# Patient Record
Sex: FEMALE | Race: WHITE | URBAN - METROPOLITAN AREA
[De-identification: names, ages, dates, MRNs, and addresses within clinical notes are randomized per-mention and may not be internally consistent; named-entity substitution may affect disease eponyms.]

---

## 2018-08-22 ENCOUNTER — APPOINTMENT (RX ONLY)
Dept: URBAN - METROPOLITAN AREA CLINIC 45 | Facility: CLINIC | Age: 58
Setting detail: DERMATOLOGY
End: 2018-08-22

## 2018-08-22 DIAGNOSIS — D22 MELANOCYTIC NEVI: ICD-10-CM | Status: STABLE

## 2018-08-22 DIAGNOSIS — L30.4 ERYTHEMA INTERTRIGO: ICD-10-CM

## 2018-08-22 DIAGNOSIS — L81.2 FRECKLES: ICD-10-CM | Status: STABLE
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
## 2018-08-22 DIAGNOSIS — L57.0 ACTINIC KERATOSIS: ICD-10-CM | Status: INADEQUATELY CONTROLLED
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
## 2018-08-22 PROBLEM — D22.4 MELANOCYTIC NEVI OF SCALP AND NECK: Status: ACTIVE | Noted: 2018-08-22

## 2018-08-22 PROCEDURE — ? COUNSELING

## 2018-08-22 PROCEDURE — 17000 DESTRUCT PREMALG LESION: CPT

## 2018-08-22 PROCEDURE — ? TREATMENT REGIMEN

## 2018-08-22 PROCEDURE — 99214 OFFICE O/P EST MOD 30 MIN: CPT | Mod: 25

## 2018-08-22 PROCEDURE — ? SUNSCREEN RECOMMENDATIONS

## 2018-08-22 PROCEDURE — ? LIQUID NITROGEN

## 2018-08-22 PROCEDURE — 17003 DESTRUCT PREMALG LES 2-14: CPT

## 2018-08-22 ASSESSMENT — LOCATION SIMPLE DESCRIPTION DERM
LOCATION SIMPLE: POSTERIOR SCALP
LOCATION SIMPLE: NOSE
LOCATION SIMPLE: LEFT PRETIBIAL REGION
LOCATION SIMPLE: LEFT FOREHEAD
LOCATION SIMPLE: LEFT UPPER BACK
LOCATION SIMPLE: ABDOMEN

## 2018-08-22 ASSESSMENT — LOCATION ZONE DERM
LOCATION ZONE: FACE
LOCATION ZONE: SCALP
LOCATION ZONE: TRUNK
LOCATION ZONE: NOSE
LOCATION ZONE: LEG

## 2018-08-22 ASSESSMENT — LOCATION DETAILED DESCRIPTION DERM
LOCATION DETAILED: LEFT RIB CAGE
LOCATION DETAILED: RIGHT OCCIPITAL SCALP
LOCATION DETAILED: NASAL DORSUM
LOCATION DETAILED: LEFT SUPERIOR MEDIAL UPPER BACK
LOCATION DETAILED: LEFT DISTAL PRETIBIAL REGION
LOCATION DETAILED: RIGHT RIB CAGE
LOCATION DETAILED: LEFT SUPERIOR FOREHEAD

## 2018-08-22 ASSESSMENT — PAIN INTENSITY VAS: HOW INTENSE IS YOUR PAIN 0 BEING NO PAIN, 10 BEING THE MOST SEVERE PAIN POSSIBLE?: NO PAIN

## 2018-08-22 NOTE — PROCEDURE: SUNSCREEN RECOMMENDATIONS
Products Recommended: Elta MD UV Elements
Detail Level: Zone
General Sunscreen Counseling: I recommended a broad spectrum sunscreen with a SPF of 30 or higher.  I explained that SPF 30 sunscreens block approximately 97 percent of the sun's harmful rays.  Sunscreens should be applied at least 15 minutes prior to expected sun exposure and then every 2 hours after that as long as sun exposure continues. If swimming or exercising sunscreen should be reapplied every 45 minutes to an hour after getting wet or sweating.  One ounce, or the equivalent of a shot glass full of sunscreen, is adequate to protect the skin not covered by a bathing suit. I also recommended a lip balm with a sunscreen as well. Sun protective clothing can be used in lieu of sunscreen but must be worn the entire time you are exposed to the sun's rays.

## 2019-09-30 ENCOUNTER — APPOINTMENT (RX ONLY)
Dept: URBAN - METROPOLITAN AREA CLINIC 45 | Facility: CLINIC | Age: 59
Setting detail: DERMATOLOGY
End: 2019-09-30

## 2019-09-30 DIAGNOSIS — L57.0 ACTINIC KERATOSIS: ICD-10-CM

## 2019-09-30 DIAGNOSIS — D22 MELANOCYTIC NEVI: ICD-10-CM | Status: STABLE

## 2019-09-30 DIAGNOSIS — L30.4 ERYTHEMA INTERTRIGO: ICD-10-CM

## 2019-09-30 DIAGNOSIS — L82.1 OTHER SEBORRHEIC KERATOSIS: ICD-10-CM | Status: STABLE

## 2019-09-30 DIAGNOSIS — L28.1 PRURIGO NODULARIS: ICD-10-CM

## 2019-09-30 PROBLEM — D22.5 MELANOCYTIC NEVI OF TRUNK: Status: ACTIVE | Noted: 2019-09-30

## 2019-09-30 PROCEDURE — ? OTHER

## 2019-09-30 PROCEDURE — ? TREATMENT REGIMEN

## 2019-09-30 PROCEDURE — ? PHOTO-DOCUMENTATION

## 2019-09-30 PROCEDURE — 17000 DESTRUCT PREMALG LESION: CPT

## 2019-09-30 PROCEDURE — ? COUNSELING

## 2019-09-30 PROCEDURE — 99214 OFFICE O/P EST MOD 30 MIN: CPT | Mod: 25

## 2019-09-30 PROCEDURE — ? LIQUID NITROGEN

## 2019-09-30 ASSESSMENT — LOCATION DETAILED DESCRIPTION DERM
LOCATION DETAILED: LEFT RIB CAGE
LOCATION DETAILED: RIGHT SUPERIOR POSTERIOR NECK
LOCATION DETAILED: SUPERIOR LUMBAR SPINE
LOCATION DETAILED: LEFT UPPER CUTANEOUS LIP
LOCATION DETAILED: RIGHT DISTAL POSTERIOR UPPER ARM
LOCATION DETAILED: RIGHT RIB CAGE
LOCATION DETAILED: XIPHOID
LOCATION DETAILED: RIGHT SUPERIOR LATERAL LOWER BACK

## 2019-09-30 ASSESSMENT — LOCATION SIMPLE DESCRIPTION DERM
LOCATION SIMPLE: LOWER BACK
LOCATION SIMPLE: LEFT LIP
LOCATION SIMPLE: RIGHT LOWER BACK
LOCATION SIMPLE: POSTERIOR NECK
LOCATION SIMPLE: RIGHT POSTERIOR UPPER ARM
LOCATION SIMPLE: ABDOMEN

## 2019-09-30 ASSESSMENT — PAIN INTENSITY VAS: HOW INTENSE IS YOUR PAIN 0 BEING NO PAIN, 10 BEING THE MOST SEVERE PAIN POSSIBLE?: NO PAIN

## 2019-09-30 ASSESSMENT — LOCATION ZONE DERM
LOCATION ZONE: ARM
LOCATION ZONE: NECK
LOCATION ZONE: TRUNK
LOCATION ZONE: LIP

## 2019-09-30 NOTE — PROCEDURE: OTHER
Detail Level: Simple
Note Text (......Xxx Chief Complaint.): This diagnosis correlates with the
Other (Free Text): LN2 no charge

## 2019-10-11 ENCOUNTER — APPOINTMENT (RX ONLY)
Dept: URBAN - METROPOLITAN AREA CLINIC 45 | Facility: CLINIC | Age: 59
Setting detail: DERMATOLOGY
End: 2019-10-11

## 2019-10-11 DIAGNOSIS — Z41.9 ENCOUNTER FOR PROCEDURE FOR PURPOSES OTHER THAN REMEDYING HEALTH STATE, UNSPECIFIED: ICD-10-CM

## 2019-10-11 PROCEDURE — ? LUMENIS M22

## 2019-10-11 ASSESSMENT — LOCATION ZONE DERM
LOCATION ZONE: FACE
LOCATION ZONE: NOSE

## 2019-10-11 ASSESSMENT — LOCATION DETAILED DESCRIPTION DERM
LOCATION DETAILED: RIGHT INFERIOR CENTRAL MALAR CHEEK
LOCATION DETAILED: NASAL DORSUM
LOCATION DETAILED: LEFT CENTRAL MALAR CHEEK

## 2019-10-11 ASSESSMENT — LOCATION SIMPLE DESCRIPTION DERM
LOCATION SIMPLE: LEFT CHEEK
LOCATION SIMPLE: RIGHT CHEEK
LOCATION SIMPLE: NOSE

## 2019-10-11 NOTE — PROCEDURE: LUMENIS M22
Pulse Delay (In Milliseconds): 20
External Cooling Fan Speed: 0
Fluence: 18
Consent: Written consent obtained, risks reviewed including but not limited to crusting, scabbing, blistering, scarring, darker or lighter pigmentary change, bruising, and/or incomplete response.
Fluence Units: J/cm2
Number Of Passes: 1
Skin Type (Optional): III
Total Pulses: 20
Procedure Text: The patient's skin was cleaned and the procedure was performed using the parameters noted above.
Treated Area: large area
Pulse Duration (In Milliseconds): 3.5
Detail Level: Generalized
Depth: medium
Post-Care Instructions: I reviewed with the patient in detail post-care instructions. Patient should stay away from the sun and use sunscreen, client is very dark she is saying no sun for the last two weeks but wanted to do the treatment. Had her sign paperwork that she could burn if she has been in the sun.
Price (Use Numbers Only, No Special Characters Or $): 50.00

## 2019-12-05 ENCOUNTER — APPOINTMENT (RX ONLY)
Dept: URBAN - METROPOLITAN AREA CLINIC 45 | Facility: CLINIC | Age: 59
Setting detail: DERMATOLOGY
End: 2019-12-05

## 2019-12-05 DIAGNOSIS — Z41.9 ENCOUNTER FOR PROCEDURE FOR PURPOSES OTHER THAN REMEDYING HEALTH STATE, UNSPECIFIED: ICD-10-CM

## 2019-12-05 DIAGNOSIS — Z41.9 ENCOUNTER FOR PROCEDURE FOR PURPOSES OTHER THAN REMEDYING HEALTH STATE, UNSPECIFIED: ICD-10-CM | Status: INADEQUATELY CONTROLLED

## 2019-12-05 PROCEDURE — ? FILLERS

## 2019-12-05 PROCEDURE — ? LUMENIS M22

## 2019-12-05 ASSESSMENT — LOCATION SIMPLE DESCRIPTION DERM
LOCATION SIMPLE: LEFT CHEEK
LOCATION SIMPLE: RIGHT CHEEK
LOCATION SIMPLE: LEFT LIP
LOCATION SIMPLE: RIGHT NOSE
LOCATION SIMPLE: LEFT CHEEK
LOCATION SIMPLE: RIGHT LIP

## 2019-12-05 ASSESSMENT — LOCATION ZONE DERM
LOCATION ZONE: NOSE
LOCATION ZONE: LIP
LOCATION ZONE: FACE
LOCATION ZONE: FACE

## 2019-12-05 ASSESSMENT — LOCATION DETAILED DESCRIPTION DERM
LOCATION DETAILED: LEFT MEDIAL BUCCAL CHEEK
LOCATION DETAILED: LEFT NASOLABIAL FOLD
LOCATION DETAILED: RIGHT NASOLABIAL FOLD
LOCATION DETAILED: LEFT MEDIAL MALAR CHEEK
LOCATION DETAILED: RIGHT NASAL ALA
LOCATION DETAILED: RIGHT MEDIAL BUCCAL CHEEK

## 2019-12-05 NOTE — PROCEDURE: LUMENIS M22
Skin Type (Optional): III
Fluence: 19
Total Pulses: 4
Consent: Written consent obtained, risks reviewed including but not limited to crusting, scabbing, blistering, scarring, darker or lighter pigmentary change, bruising, and/or incomplete response.
Treated Area: large area
Treatment Number: 2
External Cooling Fan Speed: 0
Number Of Passes: 1
Depth: medium
Detail Level: Generalized
Post-Care Instructions: I reviewed with the patient in detail post-care instructions. Patient should stay away from the sun and use sunscreen, client is very dark she is saying no sun for the last two weeks but wanted to do the treatment. Had her sign paperwork that she could burn if she has been in the sun.
Pulse Delay (In Milliseconds): 20
Price (Use Numbers Only, No Special Characters Or $): 50.00
Fluence Units: J/cm2
Procedure Text: The patient's skin was cleaned and the procedure was performed using the parameters noted above.

## 2019-12-05 NOTE — PROCEDURE: FILLERS
Include Cannula Information In Note?: No
Vermilion Lips Filler Volume In Cc: 0
Anesthesia Volume In Cc: 0.5
Additional Anesthesia Volume In Cc: 6
Filler: Restylane
Detail Level: Zone
Consent: Written consent obtained. Risks include but not limited to bruising, beading, irregular texture, ulceration, infection, allergic reaction, scar formation, incomplete augmentation, temporary nature, procedural pain.
Post-Care Instructions: Patient instructed to apply ice to reduce swelling.
Price (Use Numbers Only, No Special Characters Or $): 1000
Use Map Statement For Sites (Optional): Yes
Lot #: [id number]
Map Statment: See Attach Map for Complete Details
Expiration Date (Month Year): 03-

## 2020-06-04 ENCOUNTER — APPOINTMENT (RX ONLY)
Dept: URBAN - METROPOLITAN AREA CLINIC 45 | Facility: CLINIC | Age: 60
Setting detail: DERMATOLOGY
End: 2020-06-04

## 2020-06-04 VITALS — TEMPERATURE: 99.8 F

## 2020-06-04 DIAGNOSIS — L72.8 OTHER FOLLICULAR CYSTS OF THE SKIN AND SUBCUTANEOUS TISSUE: ICD-10-CM

## 2020-06-04 DIAGNOSIS — L73.8 OTHER SPECIFIED FOLLICULAR DISORDERS: ICD-10-CM

## 2020-06-04 DIAGNOSIS — L98.8 OTHER SPECIFIED DISORDERS OF THE SKIN AND SUBCUTANEOUS TISSUE: ICD-10-CM

## 2020-06-04 PROCEDURE — ? COSMETIC CONSULTATION: FILLERS

## 2020-06-04 PROCEDURE — ? FILLERS (CC)

## 2020-06-04 PROCEDURE — ? ADDITIONAL NOTES

## 2020-06-04 PROCEDURE — ? COUNSELING

## 2020-06-04 ASSESSMENT — LOCATION SIMPLE DESCRIPTION DERM
LOCATION SIMPLE: LEFT NOSE
LOCATION SIMPLE: RIGHT NOSE
LOCATION SIMPLE: RIGHT CHEEK
LOCATION SIMPLE: LEFT CHEEK

## 2020-06-04 ASSESSMENT — LOCATION DETAILED DESCRIPTION DERM
LOCATION DETAILED: RIGHT INFERIOR MEDIAL MALAR CHEEK
LOCATION DETAILED: LEFT NASAL SIDEWALL
LOCATION DETAILED: LEFT SUPERIOR MEDIAL BUCCAL CHEEK
LOCATION DETAILED: RIGHT NASAL SIDEWALL

## 2020-06-04 ASSESSMENT — LOCATION ZONE DERM
LOCATION ZONE: FACE
LOCATION ZONE: NOSE

## 2020-06-04 NOTE — PROCEDURE: FILLERS (CC)
Nasolabial Folds Filler Volume In Cc: 1
Detail Level: Detailed
Marionette Lines Filler  Volume In Cc: 0
Lot #: [id number]
Price (Use Numbers Only, No Special Characters Or $): 500
Filler: Dion Samuels
Anesthesia Volume In Cc: 0.5
Expiration Date (Month Year): 08/31/2022
Consent: Written consent obtained. Risks include but not limited to bruising, beading, irregular texture, ulceration, infection, allergic reaction, scar formation, incomplete augmentation, temporary nature, procedural pain.
Anesthesia Type: 1% lidocaine with epinephrine
Additional Anesthesia Volume In Cc: 6
Post-Care Instructions: Patient instructed to apply ice to reduce swelling.

## 2020-09-30 ENCOUNTER — APPOINTMENT (RX ONLY)
Dept: URBAN - METROPOLITAN AREA CLINIC 47 | Facility: CLINIC | Age: 60
Setting detail: DERMATOLOGY
End: 2020-09-30

## 2020-09-30 VITALS — TEMPERATURE: 98 F

## 2020-09-30 DIAGNOSIS — D22 MELANOCYTIC NEVI: ICD-10-CM

## 2020-09-30 DIAGNOSIS — L57.0 ACTINIC KERATOSIS: ICD-10-CM

## 2020-09-30 PROBLEM — D22.5 MELANOCYTIC NEVI OF TRUNK: Status: ACTIVE | Noted: 2020-09-30

## 2020-09-30 PROCEDURE — ? COUNSELING

## 2020-09-30 PROCEDURE — 17000 DESTRUCT PREMALG LESION: CPT

## 2020-09-30 PROCEDURE — ? LIQUID NITROGEN

## 2020-09-30 PROCEDURE — ? PRESCRIPTION

## 2020-09-30 PROCEDURE — ? TREATMENT REGIMEN

## 2020-09-30 PROCEDURE — 99214 OFFICE O/P EST MOD 30 MIN: CPT | Mod: 25

## 2020-09-30 RX ORDER — PHARMACY COMPOUNDING ACCESSORY
EACH MISCELLANEOUS BID
Qty: 1 | Refills: 0 | Status: ERX | COMMUNITY
Start: 2020-09-30

## 2020-09-30 RX ADMIN — Medication: at 00:00

## 2020-09-30 ASSESSMENT — LOCATION ZONE DERM
LOCATION ZONE: NOSE
LOCATION ZONE: TRUNK

## 2020-09-30 ASSESSMENT — LOCATION DETAILED DESCRIPTION DERM
LOCATION DETAILED: RIGHT SUPERIOR UPPER BACK
LOCATION DETAILED: EPIGASTRIC SKIN
LOCATION DETAILED: LEFT NASAL DORSUM

## 2020-09-30 ASSESSMENT — LOCATION SIMPLE DESCRIPTION DERM
LOCATION SIMPLE: ABDOMEN
LOCATION SIMPLE: NOSE
LOCATION SIMPLE: RIGHT UPPER BACK

## 2020-12-07 ENCOUNTER — APPOINTMENT (RX ONLY)
Dept: URBAN - METROPOLITAN AREA CLINIC 45 | Facility: CLINIC | Age: 60
Setting detail: DERMATOLOGY
End: 2020-12-07

## 2020-12-07 VITALS — TEMPERATURE: 98.6 F

## 2020-12-07 DIAGNOSIS — L71.8 OTHER ROSACEA: ICD-10-CM

## 2020-12-07 DIAGNOSIS — L98.8 OTHER SPECIFIED DISORDERS OF THE SKIN AND SUBCUTANEOUS TISSUE: ICD-10-CM

## 2020-12-07 PROCEDURE — ? ADDITIONAL NOTES

## 2020-12-07 PROCEDURE — ? TREATMENT REGIMEN

## 2020-12-07 PROCEDURE — ? FILLERS (CC)

## 2020-12-07 PROCEDURE — ? COUNSELING

## 2020-12-07 ASSESSMENT — LOCATION ZONE DERM: LOCATION ZONE: FACE

## 2020-12-07 ASSESSMENT — LOCATION SIMPLE DESCRIPTION DERM
LOCATION SIMPLE: LEFT CHEEK
LOCATION SIMPLE: RIGHT CHEEK

## 2020-12-07 ASSESSMENT — LOCATION DETAILED DESCRIPTION DERM
LOCATION DETAILED: LEFT INFERIOR MEDIAL MALAR CHEEK
LOCATION DETAILED: RIGHT INFERIOR MEDIAL MALAR CHEEK
LOCATION DETAILED: LEFT MEDIAL MALAR CHEEK
LOCATION DETAILED: RIGHT MEDIAL MALAR CHEEK

## 2020-12-07 NOTE — PROCEDURE: ADDITIONAL NOTES
Additional Notes: Sale price given: $625 minus 20% discount = $500\\nCosmetic Face Sheet Scanned
Detail Level: Simple

## 2020-12-07 NOTE — PROCEDURE: FILLERS (CC)
Nasolabial Folds Filler Volume In Cc: 1
Detail Level: Detailed
Marionette Lines Filler  Volume In Cc: 0
Lot #: [id number]
Price (Use Numbers Only, No Special Characters Or $): 500
Filler: Dion Samuels
Anesthesia Volume In Cc: 0.5
Expiration Date (Month Year): 5/31/2023
Consent: Written consent obtained. Risks include but not limited to bruising, beading, irregular texture, ulceration, infection, allergic reaction, scar formation, incomplete augmentation, temporary nature, procedural pain.
Anesthesia Type: 1% lidocaine with epinephrine
Additional Anesthesia Volume In Cc: 6
Post-Care Instructions: Patient instructed to apply ice to reduce swelling.
Quantity Per Injection Site (Cc): 0.5 cc

## 2021-06-14 ENCOUNTER — APPOINTMENT (RX ONLY)
Dept: URBAN - METROPOLITAN AREA CLINIC 45 | Facility: CLINIC | Age: 61
Setting detail: DERMATOLOGY
End: 2021-06-14

## 2021-06-14 VITALS — TEMPERATURE: 97.8 F

## 2021-06-14 DIAGNOSIS — L98.8 OTHER SPECIFIED DISORDERS OF THE SKIN AND SUBCUTANEOUS TISSUE: ICD-10-CM

## 2021-06-14 PROCEDURE — ? FILLERS (CC)

## 2021-06-14 ASSESSMENT — LOCATION ZONE DERM: LOCATION ZONE: FACE

## 2021-06-14 ASSESSMENT — LOCATION DETAILED DESCRIPTION DERM
LOCATION DETAILED: LEFT INFERIOR MEDIAL MALAR CHEEK
LOCATION DETAILED: RIGHT INFERIOR MEDIAL MALAR CHEEK

## 2021-06-14 ASSESSMENT — LOCATION SIMPLE DESCRIPTION DERM
LOCATION SIMPLE: RIGHT CHEEK
LOCATION SIMPLE: LEFT CHEEK

## 2021-06-14 NOTE — PROCEDURE: FILLERS (CC)
Nasolabial Folds Filler Volume In Cc: 1
Detail Level: Detailed
Marionette Lines Filler  Volume In Cc: 0
Lot #: [id number]
Price (Use Numbers Only, No Special Characters Or $): 785
Filler: Dion Samuels
Anesthesia Volume In Cc: 0.5
Expiration Date (Month Year): 9/30/2023
Consent: Written consent obtained. Risks include but not limited to bruising, beading, irregular texture, ulceration, infection, allergic reaction, scar formation, incomplete augmentation, temporary nature, procedural pain.
Anesthesia Type: 1% lidocaine with epinephrine
Additional Anesthesia Volume In Cc: 6
Post-Care Instructions: Patient instructed to apply ice to reduce swelling.
Quantity Per Injection Site (Cc): 0.5 cc

## 2022-05-11 ENCOUNTER — APPOINTMENT (RX ONLY)
Dept: URBAN - METROPOLITAN AREA CLINIC 45 | Facility: CLINIC | Age: 62
Setting detail: DERMATOLOGY
End: 2022-05-11

## 2022-05-11 DIAGNOSIS — Z41.9 ENCOUNTER FOR PROCEDURE FOR PURPOSES OTHER THAN REMEDYING HEALTH STATE, UNSPECIFIED: ICD-10-CM

## 2022-05-11 PROCEDURE — ? OTHER

## 2022-05-11 PROCEDURE — ? COSMETIC CONSULTATION: BOTOX

## 2022-05-11 PROCEDURE — ? LUMENIS M22

## 2022-05-11 PROCEDURE — ? FILLERS

## 2022-05-11 ASSESSMENT — LOCATION ZONE DERM
LOCATION ZONE: FACE
LOCATION ZONE: FACE
LOCATION ZONE: LIP
LOCATION ZONE: NOSE

## 2022-05-11 ASSESSMENT — LOCATION DETAILED DESCRIPTION DERM
LOCATION DETAILED: GLABELLA
LOCATION DETAILED: RIGHT UPPER CUTANEOUS LIP
LOCATION DETAILED: LEFT CHIN
LOCATION DETAILED: LEFT SUPERIOR MEDIAL BUCCAL CHEEK
LOCATION DETAILED: LEFT UPPER CUTANEOUS LIP
LOCATION DETAILED: RIGHT LOWER CUTANEOUS LIP
LOCATION DETAILED: NASAL TIP
LOCATION DETAILED: RIGHT SUPERIOR MEDIAL BUCCAL CHEEK
LOCATION DETAILED: LEFT MEDIAL BUCCAL CHEEK
LOCATION DETAILED: LEFT INFERIOR MEDIAL MALAR CHEEK
LOCATION DETAILED: RIGHT INFERIOR MEDIAL MALAR CHEEK
LOCATION DETAILED: RIGHT MEDIAL BUCCAL CHEEK
LOCATION DETAILED: RIGHT INFERIOR CENTRAL MALAR CHEEK
LOCATION DETAILED: LEFT CENTRAL MALAR CHEEK
LOCATION DETAILED: LEFT ORAL COMMISSURE

## 2022-05-11 ASSESSMENT — LOCATION SIMPLE DESCRIPTION DERM
LOCATION SIMPLE: LEFT CHEEK
LOCATION SIMPLE: GLABELLA
LOCATION SIMPLE: RIGHT CHEEK
LOCATION SIMPLE: RIGHT CHEEK
LOCATION SIMPLE: CHIN
LOCATION SIMPLE: LEFT CHEEK
LOCATION SIMPLE: RIGHT LIP
LOCATION SIMPLE: NOSE
LOCATION SIMPLE: LEFT LIP

## 2022-05-11 NOTE — PROCEDURE: OTHER
Other (Free Text): 10 units forehead and 12 glabella for 308.00
Detail Level: Generalized
Note Text (......Xxx Chief Complaint.): This diagnosis correlates with the
Render Risk Assessment In Note?: no

## 2022-05-11 NOTE — PROCEDURE: FILLERS
Decollete Filler  Volume In Cc: 0
Detail Level: Detailed
Filler: RHA 4
Additional Area 1 Volume In Cc: 0.5
Additional Area 1 Location: Oral Commissures
Additional Anesthesia Volume In Cc: 6
Include Cannula Information In Note?: No
Anesthesia Type: 1% lidocaine with epinephrine
Post-Care Instructions: Patient instructed to apply ice to reduce swelling.
Consent: Written consent obtained. Risks include but not limited to bruising, beading, irregular texture, ulceration, infection, allergic reaction, scar formation, incomplete augmentation, temporary nature, procedural pain.
Map Statment: See Attach Map for Complete Details
Price (Use Numbers Only, No Special Characters Or $): 750.00

## 2022-05-11 NOTE — PROCEDURE: LUMENIS M22
Price (Use Numbers Only, No Special Characters Or $): 50.00
Pulse Delay (In Milliseconds): 35
Depth: medium
Detail Level: Simple
Pulse Duration (In Milliseconds): 4
Consent: Written consent obtained, risks reviewed including but not limited to crusting, scabbing, blistering, scarring, darker or lighter pigmentary change, bruising, and/or incomplete response.
Number Of Passes: 2
Treatment Number: 1
External Cooling Fan Speed: 0
Skin Type (Optional): III
Treated Area: small area
Procedure Text: The patient's skin was cleaned and the procedure was performed using the parameters noted above.
Total Pulses: 59
Fluence: 21
Post-Care Instructions: I reviewed with the patient in detail post-care instructions. Patient should stay away from the sun and use.
Fluence Units: J/cm2

## 2022-05-19 ENCOUNTER — APPOINTMENT (RX ONLY)
Dept: URBAN - METROPOLITAN AREA CLINIC 45 | Facility: CLINIC | Age: 62
Setting detail: DERMATOLOGY
End: 2022-05-19

## 2022-05-19 DIAGNOSIS — Z41.9 ENCOUNTER FOR PROCEDURE FOR PURPOSES OTHER THAN REMEDYING HEALTH STATE, UNSPECIFIED: ICD-10-CM

## 2022-05-19 PROCEDURE — ? BOTOX (U OR CC)

## 2022-05-19 ASSESSMENT — LOCATION SIMPLE DESCRIPTION DERM
LOCATION SIMPLE: RIGHT FOREHEAD
LOCATION SIMPLE: SUPERIOR FOREHEAD
LOCATION SIMPLE: GLABELLA
LOCATION SIMPLE: LEFT FOREHEAD

## 2022-05-19 ASSESSMENT — LOCATION DETAILED DESCRIPTION DERM
LOCATION DETAILED: SUPERIOR MID FOREHEAD
LOCATION DETAILED: GLABELLA
LOCATION DETAILED: RIGHT FOREHEAD
LOCATION DETAILED: RIGHT INFERIOR MEDIAL FOREHEAD
LOCATION DETAILED: LEFT FOREHEAD
LOCATION DETAILED: LEFT INFERIOR MEDIAL FOREHEAD

## 2022-05-19 ASSESSMENT — LOCATION ZONE DERM: LOCATION ZONE: FACE

## 2022-05-19 NOTE — PROCEDURE: BOTOX (U OR CC)
Additional Area 3 Units: 0
Detail Level: Detailed
Glabellar Complex Units: 10
Lot #: [id number]
Consent: Written consent obtained. Risks include but not limited to lid/brow ptosis, bruising, swelling, diplopia, temporary effect, incomplete chemical denervation.
Including Pricing Information In The Note: No
Document As Units Or Cc?: units
Dilution (U/0.1 Cc): 1
Expiration Date (Month Year): 05/24
Forehead Units/Cc: 12
Price (Use Numbers Only, No Special Characters Or $): 308
Post-Care Instructions: Patient instructed to not lie down for 2 hours, to not rub the treated area and to actively move treated muscles for the next 2 hours.
Quantity Per Injection Site (Units Or Cc): 4 U
Quantity Per Injection Site (Units Or Cc): 2 U

## 2022-06-10 ENCOUNTER — APPOINTMENT (RX ONLY)
Dept: URBAN - METROPOLITAN AREA CLINIC 45 | Facility: CLINIC | Age: 62
Setting detail: DERMATOLOGY
End: 2022-06-10